# Patient Record
Sex: FEMALE | Race: WHITE | Employment: OTHER | URBAN - METROPOLITAN AREA
[De-identification: names, ages, dates, MRNs, and addresses within clinical notes are randomized per-mention and may not be internally consistent; named-entity substitution may affect disease eponyms.]

---

## 2021-05-25 ENCOUNTER — NEW PATIENT COMPREHENSIVE (OUTPATIENT)
Dept: URBAN - METROPOLITAN AREA CLINIC 43 | Facility: CLINIC | Age: 71
End: 2021-05-25

## 2021-05-25 DIAGNOSIS — H52.7: ICD-10-CM

## 2021-05-25 DIAGNOSIS — H26.493: ICD-10-CM

## 2021-05-25 DIAGNOSIS — Z96.1: ICD-10-CM

## 2021-05-25 PROCEDURE — 92004 COMPRE OPH EXAM NEW PT 1/>: CPT

## 2021-05-25 PROCEDURE — 92015 DETERMINE REFRACTIVE STATE: CPT

## 2021-05-25 ASSESSMENT — KERATOMETRY
OS_AXISANGLE_DEGREES: 146
OD_AXISANGLE2_DEGREES: 135
OS_K1POWER_DIOPTERS: 43.25
OD_K2POWER_DIOPTERS: 43.25
OD_K1POWER_DIOPTERS: 43.00
OD_AXISANGLE_DEGREES: 45
OS_K2POWER_DIOPTERS: 43.50
OS_AXISANGLE2_DEGREES: 56

## 2021-05-25 ASSESSMENT — TONOMETRY
OS_IOP_MMHG: 14
OD_IOP_MMHG: 13

## 2021-05-25 ASSESSMENT — VISUAL ACUITY
OS_SC: 20/30-1+2
OS_CC: J1+
OD_SC: 20/25-2
OU_SC: 20/20-2
OD_CC: J1+
OU_SC: J5
OD_SC: J8
OS_SC: J8
OU_CC: J1+

## 2021-06-15 ASSESSMENT — KERATOMETRY
OS_K2POWER_DIOPTERS: 43.50
OD_AXISANGLE2_DEGREES: 135
OS_K1POWER_DIOPTERS: 43.25
OS_AXISANGLE_DEGREES: 146
OS_AXISANGLE2_DEGREES: 56
OD_K1POWER_DIOPTERS: 43.00
OD_K2POWER_DIOPTERS: 43.25
OD_AXISANGLE_DEGREES: 45

## 2021-06-16 ENCOUNTER — YAG EVALUATION (OUTPATIENT)
Dept: URBAN - METROPOLITAN AREA CLINIC 39 | Facility: CLINIC | Age: 71
End: 2021-06-16

## 2021-06-16 ENCOUNTER — SURGERY/PROCEDURE (OUTPATIENT)
Dept: URBAN - METROPOLITAN AREA SURGERY 14 | Facility: SURGERY | Age: 71
End: 2021-06-16

## 2021-06-16 DIAGNOSIS — H26.493: ICD-10-CM

## 2021-06-16 PROCEDURE — 6682150 YAG CAPSULOTOMY

## 2021-06-16 PROCEDURE — 92014 COMPRE OPH EXAM EST PT 1/>: CPT

## 2021-06-16 ASSESSMENT — VISUAL ACUITY
OS_SC: 20/40-1
OD_BAT: 20/50
OD_SC: 20/25
OS_BAT: 20/70

## 2021-06-16 ASSESSMENT — TONOMETRY
OD_IOP_MMHG: 14
OS_IOP_MMHG: 14

## 2021-06-23 ENCOUNTER — YAG POST-OP (OUTPATIENT)
Dept: URBAN - METROPOLITAN AREA CLINIC 43 | Facility: CLINIC | Age: 71
End: 2021-06-23

## 2021-06-23 DIAGNOSIS — H26.493: ICD-10-CM

## 2021-06-23 DIAGNOSIS — Z96.1: ICD-10-CM

## 2021-06-23 PROCEDURE — 99024 POSTOP FOLLOW-UP VISIT: CPT

## 2021-06-23 ASSESSMENT — KERATOMETRY
OD_AXISANGLE_DEGREES: 45
OS_K1POWER_DIOPTERS: 43.25
OD_AXISANGLE2_DEGREES: 135
OS_AXISANGLE_DEGREES: 146
OD_K2POWER_DIOPTERS: 43.25
OS_K2POWER_DIOPTERS: 43.50
OD_K1POWER_DIOPTERS: 43.00
OS_AXISANGLE2_DEGREES: 56

## 2021-06-23 ASSESSMENT — VISUAL ACUITY
OD_CC: J1
OS_SC: 20/20-2
OD_SC: 20/30+1
OS_CC: J1

## 2021-06-23 ASSESSMENT — TONOMETRY
OD_IOP_MMHG: 14
OS_IOP_MMHG: 13

## 2021-06-24 ASSESSMENT — KERATOMETRY
OS_AXISANGLE_DEGREES: 146
OD_K2POWER_DIOPTERS: 43.25
OS_K2POWER_DIOPTERS: 43.50
OD_K1POWER_DIOPTERS: 43.00
OS_AXISANGLE2_DEGREES: 56
OS_K1POWER_DIOPTERS: 43.25
OD_AXISANGLE2_DEGREES: 135
OD_AXISANGLE_DEGREES: 45

## 2022-09-06 ENCOUNTER — COMPREHENSIVE EXAM (OUTPATIENT)
Dept: URBAN - METROPOLITAN AREA CLINIC 47 | Facility: CLINIC | Age: 72
End: 2022-09-06

## 2022-09-06 DIAGNOSIS — H26.493: ICD-10-CM

## 2022-09-06 DIAGNOSIS — H02.886: ICD-10-CM

## 2022-09-06 DIAGNOSIS — H02.883: ICD-10-CM

## 2022-09-06 DIAGNOSIS — Z96.1: ICD-10-CM

## 2022-09-06 DIAGNOSIS — H52.7: ICD-10-CM

## 2022-09-06 PROCEDURE — 92015 DETERMINE REFRACTIVE STATE: CPT

## 2022-09-06 PROCEDURE — 92014 COMPRE OPH EXAM EST PT 1/>: CPT

## 2022-09-06 ASSESSMENT — KERATOMETRY
OS_K2POWER_DIOPTERS: 43.50
OS_AXISANGLE2_DEGREES: 56
OD_AXISANGLE_DEGREES: 45
OS_K1POWER_DIOPTERS: 43.25
OD_K2POWER_DIOPTERS: 43.25
OS_AXISANGLE_DEGREES: 146
OD_K1POWER_DIOPTERS: 43.00
OD_AXISANGLE2_DEGREES: 135

## 2022-09-06 ASSESSMENT — VISUAL ACUITY
OU_CC: J1
OD_CC: J3
OS_CC: J1
OU_SC: 20/20
OD_SC: J12
OD_SC: 20/40
OS_SC: J12+1
OS_SC: 20/20
OU_SC: J12

## 2022-09-06 ASSESSMENT — TONOMETRY
OD_IOP_MMHG: 10
OS_IOP_MMHG: 10

## 2022-10-07 NOTE — PATIENT DISCUSSION
Discussed surgical excision with KK for complete removal vs cosmetic upper medial fat pads with upper bleph and lower lid bleph with CO2.

## 2022-10-07 NOTE — PATIENT DISCUSSION
Recommended patient see PCP for bloodwork. Patient claims to see PCP only when necessary and not routinely.

## 2022-10-13 NOTE — PATIENT DISCUSSION
Pricing given for upper medial fat pad removal. Discussed possibility of xanthelasmas (UL) being removed if cosmetic fat pads added. Will confirm with JPF.

## 2023-01-09 NOTE — PATIENT DISCUSSION
All questions answered and patient understands.
Discussed additional benefit of laser improving lower lid xanthelasmas.
Discussed surgical excision with KK for complete removal vs cosmetic upper medial fat pads with upper bleph and lower lid bleph with CO2.
Glasses Rx given.
Monitor cataract for progression.
Monitor.
No Retinal holes, Tears or Detachments.
Observe.
Patient given Rx for glasses.
Patient understands condition, prognosis and need for follow up care.
Pricing given for upper medial fat pad removal. Discussed possibility of xanthelasmas (UL) being removed if cosmetic fat pads added. Will confirm with JPF.
Pricing given.
Recommend Bilateral lower lid blepharoplasty trans conj laser (discussed risks and benefits of sx. .. ).
Recommend Bilateral upper lid blepharoplasty. Predeterm (discussed risks and benefits of sx. .. ).
Recommended observation.
Recommended patient see PCP for bloodwork. Patient claims to see PCP only when necessary and not routinely.
Retinal tear and detachment warning symptoms reviewed and patient instructed to call immediately if increasing floaters, flashes, or decreasing peripheral vision.
Bed in lowest position, wheels locked, appropriate side rails in place/Call bell, personal items and telephone in reach/Instruct patient to call for assistance before getting out of bed or chair/Non-slip footwear when patient is out of bed/[place] to call system/Physically safe environment - no spills, clutter or unnecessary equipment/Purposeful Proactive Rounding/Room/bathroom lighting operational, light cord in reach

## 2023-01-31 NOTE — PATIENT DISCUSSION
All questions answered and patient understands.
Discussed additional benefit of laser improving lower lid xanthelasmas.
Discussed surgical excision with KK for complete removal vs cosmetic upper medial fat pads with upper bleph and lower lid bleph with CO2.
Glasses Rx given.
Monitor cataract for progression.
Monitor.
No Retinal holes, Tears or Detachments.
Observe.
Patient given Rx for glasses.
Patient understands condition, prognosis and need for follow up care.
Pricing given for upper medial fat pad removal. Discussed possibility of xanthelasmas (UL) being removed if cosmetic fat pads added. Will confirm with JPF.
Pricing given.
Recommend Bilateral lower lid blepharoplasty trans conj laser (discussed risks and benefits of sx. .. ).
Recommend Bilateral upper lid blepharoplasty. Predeterm (discussed risks and benefits of sx. .. ).
Recommended observation.
Recommended patient see PCP for bloodwork. Patient claims to see PCP only when necessary and not routinely.
Retinal tear and detachment warning symptoms reviewed and patient instructed to call immediately if increasing floaters, flashes, or decreasing peripheral vision.
Quality 130: Documentation Of Current Medications In The Medical Record: Current Medications Documented
Detail Level: Detailed
Quality 431: Preventive Care And Screening: Unhealthy Alcohol Use - Screening: Patient screened for unhealthy alcohol use using a single question and scores less than 2 times per year
Quality 110: Preventive Care And Screening: Influenza Immunization: Influenza Immunization previously received during influenza season
Quality 226: Preventive Care And Screening: Tobacco Use: Screening And Cessation Intervention: Patient screened for tobacco use and is an ex/non-smoker

## 2023-01-31 NOTE — PROCEDURE NOTE: SURGICAL
"<div><p><strong>MR #: [id number]</strong></p><p><strong>&nbsp;</strong></p><p><strong>PREOPERATIVE DIAGNOSIS</strong>: <span>&nbsp;&nbsp; </span><span>&nbsp;&nbsp;&nbsp;&nbsp;&nbsp;&nbsp;&nbsp;&nbsp;&nbsp;&nbsp;</span>1. Dermatochalasis upper lids</p><p><span>&nbsp; &nbsp; &nbsp; &nbsp; &nbsp; &nbsp; &nbsp; &nbsp; &nbsp; &nbsp; &nbsp; &nbsp; &nbsp; &nbsp; &nbsp; &nbsp; &nbsp; &nbsp; &nbsp; &nbsp; &nbsp; &nbsp; &nbsp; &nbsp; &nbsp; &nbsp; &nbsp; &nbsp; &nbsp; &nbsp; </span>2. Xanthelasma&rsquo;s both upper lids</p><p><span>&nbsp;&nbsp;&nbsp;&nbsp;&nbsp;&nbsp;&nbsp;&nbsp;&nbsp;&nbsp;&nbsp;&nbsp;&nbsp;&nbsp;&nbsp;&nbsp;&nbsp;&nbsp;&nbsp;&nbsp;&nbsp;&nbsp;&nbsp;&nbsp;&nbsp;&nbsp;&nbsp;&nbsp;&nbsp;&nbsp;&nbsp;&nbsp;&nbsp;&nbsp;&nbsp;&nbsp;&nbsp;&nbsp;&nbsp;&nbsp;&nbsp;&nbsp;&nbsp;&nbsp;&nbsp;&nbsp;&nbsp;&nbsp;&nbsp;&nbsp;&nbsp;&nbsp;&nbsp;&nbsp;&nbsp;&nbsp;&nbsp;&nbsp;&nbsp;&nbsp;&nbsp;&nbsp;&nbsp;&nbsp;&nbsp;&nbsp;&nbsp;&nbsp;&nbsp;&nbsp; </span></p><p><strong>POSTOPERATIVE DIAGNOSIS</strong>: <span>&nbsp;&nbsp;&nbsp;&nbsp;&nbsp;&nbsp;&nbsp;&nbsp;&nbsp;&nbsp;&nbsp;</span>Same </p><p>&nbsp;</p><p style=""margin-right:-13.5pt;tab-stops:-1.0in;""><strong>PROCEDURE:</strong> <span>&nbsp; &nbsp; &nbsp; &nbsp; &nbsp; &nbsp; &nbsp; &nbsp; &nbsp; &nbsp; &nbsp; &nbsp; &nbsp; &nbsp; &nbsp; &nbsp; &nbsp; &nbsp; &nbsp; </span>1. Upper Lid Blepharoplasty with Fat Removal OU<strong><span>&nbsp; </span></strong></p><p style=""margin-right:-13.5pt;tab-stops:-1.0in;""><strong><span>&nbsp; &nbsp; &nbsp; &nbsp; &nbsp; &nbsp; &nbsp; &nbsp; &nbsp; &nbsp; &nbsp; &nbsp; &nbsp; &nbsp; &nbsp; &nbsp; &nbsp; &nbsp; &nbsp; &nbsp; &nbsp; &nbsp; &nbsp; &nbsp; &nbsp; &nbsp; &nbsp; &nbsp; &nbsp; &nbsp; &nbsp;</span></strong><span>2. Excise/Biopsy Xanthelasma&rsquo;s both upper lids. </span></p><p style=""margin-right:-13.5pt;tab-stops:-1.0in;""><span></span><strong><span>&nbsp;&nbsp;&nbsp;&nbsp;&nbsp;&nbsp;&nbsp;&nbsp;&nbsp;&nbsp;&nbsp;&nbsp;&nbsp;&nbsp;&nbsp;&nbsp;&nbsp;&nbsp;&nbsp;&nbsp;&nbsp;&nbsp;&nbsp;&nbsp;&nbsp;&nbsp;&nbsp;&nbsp; </span><span>&nbsp;&nbsp;&nbsp;&nbsp;&nbsp;&nbsp;&nbsp;&nbsp;&nbsp;&nbsp;&nbsp; </span></strong></p><p><strong>SURGEON:</strong> <span>&nbsp;&nbsp;&nbsp;&nbsp;&nbsp;&nbsp;&nbsp;&nbsp;&nbsp;&nbsp;&nbsp;&nbsp;&nbsp;&nbsp;&nbsp;&nbsp;&nbsp;&nbsp;&nbsp;&nbsp;&nbsp;&nbsp;&nbsp;&nbsp;&nbsp;&nbsp;&nbsp;&nbsp;&nbsp;&nbsp;&nbsp;&nbsp;&nbsp;&nbsp;&nbsp;&nbsp;&nbsp; </span><span>&nbsp;&nbsp;</span><span>&nbsp;</span>Camden Silvestre

## 2023-01-31 NOTE — PATIENT DISCUSSION
"Prescription approved per [facility] Refill Protocol.  Requested Prescriptions   Pending Prescriptions Disp Refills     metFORMIN (GLUCOPHAGE) 1000 MG tablet [Pharmacy Med Name: METFORMIN HCL 1,000 MG TABLET] 60 tablet 0     Sig: TAKE 1 TABLET BY MOUTH TWICE A DAY WITH MEALS       Biguanide Agents Passed - 4/2/2021 11:00 AM        Passed - Patient is age 10 or older        Passed - Patient has documented A1c within the specified period of time.     If HgbA1C is 8 or greater, it needs to be on file within the past 3 months.  If less than 8, must be on file within the past 6 months.     Recent Labs   Lab Test 01/04/21  0736   A1C 9.1*             Passed - Patient's CR is NOT>1.4 OR Patient's EGFR is NOT<45 within past 12 mos.     Recent Labs   Lab Test 07/28/20  0717   GFRESTIMATED >90   GFRESTBLACK >90       Recent Labs   Lab Test 07/28/20  0717   CR 0.94             Passed - Patient does NOT have a diagnosis of CHF.        Passed - Medication is active on med list        Passed - Recent (6 mo) or future (30 days) visit within the authorizing provider's specialty     Patient had office visit in the last 6 months or has a visit in the next 30 days with authorizing provider or within the authorizing provider's specialty.  See \"Patient Info\" tab in inbasket, or \"Choose Columns\" in Meds & Orders section of the refill encounter.                 " Patient given Rx for glasses.

## 2023-04-21 ENCOUNTER — EMERGENCY VISIT (OUTPATIENT)
Dept: URBAN - METROPOLITAN AREA CLINIC 47 | Facility: CLINIC | Age: 73
End: 2023-04-21

## 2023-04-21 DIAGNOSIS — H02.054: ICD-10-CM

## 2023-04-21 PROCEDURE — 99213 OFFICE O/P EST LOW 20 MIN: CPT

## 2023-04-21 PROCEDURE — 67820 REVISE EYELASHES: CPT

## 2023-04-21 ASSESSMENT — KERATOMETRY
OD_AXISANGLE_DEGREES: 45
OS_K1POWER_DIOPTERS: 43.25
OD_K1POWER_DIOPTERS: 43.00
OS_AXISANGLE2_DEGREES: 56
OS_K2POWER_DIOPTERS: 43.50
OS_AXISANGLE_DEGREES: 146
OD_AXISANGLE2_DEGREES: 135
OD_K2POWER_DIOPTERS: 43.25

## 2023-04-21 ASSESSMENT — VISUAL ACUITY
OD_SC: 20/20
OS_SC: 20/25

## 2023-09-06 ENCOUNTER — COMPREHENSIVE EXAM (OUTPATIENT)
Dept: URBAN - METROPOLITAN AREA CLINIC 47 | Facility: CLINIC | Age: 73
End: 2023-09-06

## 2023-09-06 DIAGNOSIS — H52.7: ICD-10-CM

## 2023-09-06 DIAGNOSIS — H02.883: ICD-10-CM

## 2023-09-06 DIAGNOSIS — Z96.1: ICD-10-CM

## 2023-09-06 DIAGNOSIS — H02.886: ICD-10-CM

## 2023-09-06 DIAGNOSIS — H02.054: ICD-10-CM

## 2023-09-06 PROCEDURE — 92015 DETERMINE REFRACTIVE STATE: CPT

## 2023-09-06 PROCEDURE — 92014 COMPRE OPH EXAM EST PT 1/>: CPT

## 2023-09-06 ASSESSMENT — VISUAL ACUITY
OS_SC: 20/20
OU_CC: J1
OD_SC: 20/30
OS_CC: J1
OU_SC: 20/20
OD_CC: J2

## 2023-09-06 ASSESSMENT — KERATOMETRY
OD_AXISANGLE2_DEGREES: 135
OS_K1POWER_DIOPTERS: 43.25
OD_AXISANGLE_DEGREES: 45
OS_AXISANGLE_DEGREES: 146
OS_AXISANGLE2_DEGREES: 56
OD_K2POWER_DIOPTERS: 43.25
OD_K1POWER_DIOPTERS: 43.00
OS_K2POWER_DIOPTERS: 43.50

## 2023-09-06 ASSESSMENT — TONOMETRY
OD_IOP_MMHG: 12
OS_IOP_MMHG: 12

## 2023-09-20 ENCOUNTER — EMERGENCY VISIT (OUTPATIENT)
Dept: URBAN - METROPOLITAN AREA CLINIC 47 | Facility: CLINIC | Age: 73
End: 2023-09-20

## 2023-09-20 DIAGNOSIS — H02.886: ICD-10-CM

## 2023-09-20 DIAGNOSIS — H02.883: ICD-10-CM

## 2023-09-20 DIAGNOSIS — H04.123: ICD-10-CM

## 2023-09-20 PROCEDURE — 99213 OFFICE O/P EST LOW 20 MIN: CPT

## 2023-09-20 ASSESSMENT — VISUAL ACUITY
OD_SC: 20/25-1
OS_SC: 20/25+2

## 2023-09-20 ASSESSMENT — KERATOMETRY
OS_K1POWER_DIOPTERS: 43.25
OD_K2POWER_DIOPTERS: 43.25
OD_AXISANGLE_DEGREES: 45
OS_K2POWER_DIOPTERS: 43.50
OD_K1POWER_DIOPTERS: 43.00
OS_AXISANGLE2_DEGREES: 56
OD_AXISANGLE2_DEGREES: 135
OS_AXISANGLE_DEGREES: 146

## 2023-09-20 ASSESSMENT — TONOMETRY
OD_IOP_MMHG: 10
OS_IOP_MMHG: 09

## 2024-09-09 ENCOUNTER — COMPREHENSIVE EXAM (OUTPATIENT)
Dept: URBAN - METROPOLITAN AREA CLINIC 47 | Facility: CLINIC | Age: 74
End: 2024-09-09

## 2024-09-09 DIAGNOSIS — H02.886: ICD-10-CM

## 2024-09-09 DIAGNOSIS — H02.883: ICD-10-CM

## 2024-09-09 DIAGNOSIS — Z96.1: ICD-10-CM

## 2024-09-09 DIAGNOSIS — H04.123: ICD-10-CM

## 2024-09-09 DIAGNOSIS — H35.3132: ICD-10-CM

## 2024-09-09 PROCEDURE — 99214 OFFICE O/P EST MOD 30 MIN: CPT | Mod: 25

## 2024-09-09 PROCEDURE — A4262 TEMPORARY TEAR DUCT PLUG: HCPCS

## 2024-09-09 PROCEDURE — 68761C PUNCTUM PLUG /COLLAGEN, EACH: Mod: E2,E4

## 2024-09-09 PROCEDURE — 92250 FUNDUS PHOTOGRAPHY W/I&R: CPT
